# Patient Record
(demographics unavailable — no encounter records)

---

## 2024-12-23 NOTE — HISTORY OF PRESENT ILLNESS
[de-identified] : Plays with fidget spinner Frequent vocalizations to self  Clicks on I want to play, then pushes away toys I offer to her Comes behind desk and takes ball  Throws it to mom  Paces around room, lays on exam table Writes a little when asked to write name (writes lower case a, then gets up) Pushes pencil away when I ask her to write a 1  [FreeTextEntry5] : Placement: 7th Grade Type of Class: self-contained 8:1:2 Yariel Kaba MS Special Education: IEP Classification: Autism     Therapy: OT 2x42, PT 2x42, Speech/Language Therapy 4x42/10 day cycle (3 pull-out/1 push-in) Other Accommodations & Services:  - Aide or Paraprofessional 1:1  - Home VAN 2x60 (special ) - Home speech 1 hour a week - Parent training 30 hours yearly   Private: - OPWDD: Has medicaid waiver, Care Design (has not pursued self-direction yet) - Sensory Stars: ST weekly - Horsability on Saturdays - [place] respite on Saturdays (10-3)  - OMT treatments twice a month - S/P private ST at  Speech   4/15/24: IEP dated 12/8/23 reviewed. 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)  - This teacher is good but a little more set in her ways  - According to progress reports she is making progress, but she still has good days and bad days - Hard to tell why there is this variability day to day  - Overall behavior at school is about the same as last year  - Working on numerical concepts, will give 1 of 2 objects - Working on coins (matching them) but is not very interested  - Still go on field trips to sites in community (grocery store, post office) - Writes her first name with prompting, will not write numbers - Doing peer buddies and garden club after school - Added cooking activity at school (mom needs to send alternative ingredients in due to her dietary restrictions)   Home;  - Dad had surgery a few weeks ago and is now recovering so this has been a change for Itzel - Has been showing more verbal stimming, was so prominent she was not even eating breakfast  - Behavior at home is up and down - Throwing behavior still happens but less than before  - Need to watch what is left out, may get into things - Chews and bites on things often - Restarted [facility] over summer, goes most Saturdays, will go some days over break - Seems to do OK while there, keeps occupied  - Will also [facility] at the [facility]   Language;  - Changed Touch Chat device to 25 array (previously was 15) - Uses it at school and home to make requests for food and toys. - Not using verbal speech  Sleep:  - Has been having more trouble falling asleep - Clonidine was increased to 0.4 earlier this fall by Dr. Root - There was some improvement initially but now worse again - Most nights usually not asleep until after 11 PM, then sleeps through night until 7 AM   Toileting:  - BMs still fluctuate, usually formed but at times runny - Still on senna 5 squares, usually having BM daily in evening.   - Doing well with urine, may wear pull-up when they go out  - May jump up before she is done urinating, working on finishing and wiping - Wears pull-up overnight, usually wet - After OMT treatments she does better with stooling (otherwise she does more tensing and withholding).   ADLs: - Overall not much change in ADLs she can do on own - Can put on some garments, but mostly needs help with dressing  CAM;  - Seeing Dr. Root  - Glutathione infusions (about every other month)  Current meds/supplements: Clonidine 0.4 mg before bed (sleep) Hydroxyzine 100 mg before bed and after breakfast 100 mg BID (allergies) Senna 5 caps Calm Needs (combination of Sadie, Mg, other) - several caps a day CBD oil in evening (increased to 1 ml)  Sodium butyrate Itraconazole Fish oil Probiotic  B12  [FreeTextEntry6] : - Overall health has been good - CAM: Sees Dr. Root, see above. Previous treatments have included leucovorin, amoxicillin and valcyclovir, IV infusion treatments (chelation, glutathione and methylprednisolone). Continues to see cranio-scaral specialist for massages which seem to calm her. - Constipation/GI: Saw Dr. Esquivel in spring 2024.  Suggested increasing probiotics.  - Genetics: Prior CMA, HONEY were normal at [place] - Adolescent gyn; Seen by Dr. Baca for follow-up in July 2024. Recommended follow-up in 1 year, or sooner if gets period. Previously had discussed options for suppressing menstruation. She suggested having her see a nutritionist due to slow weight gain. - Eating: Eating a little more lately, has had good weight gain this fall. Gluten/soy/casein free diet since end of April 2019, she has been doing well with it. - Sleep: see above - Toileting: see above - Dental: Had 2 teeth pulled, repaired a chipped tooth in June 2024. Returned in November for follow-up, still with an impacted tooth. Will need another tooth removed in February.   - PMD: Tamika Bush. PE was 5/24/24

## 2024-12-23 NOTE — PLAN
[Findings (To Date)] : Findings from evaluation (to date) [Clinical Basis] : Clinical basis for current diagnosis and clinical impressions [Differential Diagnosis] : Differential diagnosis [Lab work-up] : laboratory work-up [Co-Morbidities] : Clinical disorders and problem commonly associated with this child's condition (now or in the future) [Medical Consultations] : Reviewed medical consultations [Developmental Testiing] : Clinical implications of developmental testing [Dev. Therapies: ____] : Benefits and limits of developmental therapies: [unfilled] [CAM Therapies] : Benefits and limits of CAM therapies [Resources] : Other available resources [CSE / IEP] : Committee on Special Education (CSE) evaluations and Individualized Education Programs (IEP) [Class Placement] : Appropriate class placement [Family Questions] : Family's questions were addressed [Diet] : Evidence-based clinical information about diet [Sleep] : The importance of sleep and strategies to ensure adequate sleep [FreeTextEntry3] : - Continue IEP services including home based services (BIS, ST) - See above for neuropsychological (PTONI 61), triennial results (SB FSIQ 40, VABS ABC 38)   - Continue private services (ST/OT, hippotherapy) - Working on obtaining self-direction through OPWDD - Previously discussed seeking private VAN to receive parent training.  - On clonidine 0.4 mg, hydroxyzine, variety of supplements - all prescribed by Dr. Root. On gluten/casein/soy free diet. Previously received IV treatments including chelation.  - Follow-up with GI/Nutrition - needs to schedule - Follow-up with adolescent GYN in July 2025 - Genetic testing including HONEY previously normal  - Follow-up with dentist - Previously introduced mother to [facility]  - Follow-up in 6-9 months

## 2024-12-23 NOTE — HISTORY OF PRESENT ILLNESS
[de-identified] : Plays with fidget spinner Frequent vocalizations to self  Clicks on I want to play, then pushes away toys I offer to her Comes behind desk and takes ball  Throws it to mom  Paces around room, lays on exam table Writes a little when asked to write name (writes lower case a, then gets up) Pushes pencil away when I ask her to write a 1  [FreeTextEntry5] : Placement: 7th Grade Type of Class: self-contained 8:1:2 Yariel Kaba MS Special Education: IEP Classification: Autism     Therapy: OT 2x42, PT 2x42, Speech/Language Therapy 4x42/10 day cycle (3 pull-out/1 push-in) Other Accommodations & Services:  - Aide or Paraprofessional 1:1  - Home VAN 2x60 (special ) - Home speech 1 hour a week - Parent training 30 hours yearly   Private: - OPWDD: Has medicaid waiver, Care Design (has not pursued self-direction yet) - Sensory Stars: ST weekly - Horsability on Saturdays - [place] respite on Saturdays (10-3)  - OMT treatments twice a month - S/P private ST at  Speech   4/15/24: IEP dated 12/8/23 reviewed. 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)  - This teacher is good but a little more set in her ways  - According to progress reports she is making progress, but she still has good days and bad days - Hard to tell why there is this variability day to day  - Overall behavior at school is about the same as last year  - Working on numerical concepts, will give 1 of 2 objects - Working on coins (matching them) but is not very interested  - Still go on field trips to sites in community (grocery store, post office) - Writes her first name with prompting, will not write numbers - Doing peer buddies and garden club after school - Added cooking activity at school (mom needs to send alternative ingredients in due to her dietary restrictions)   Home;  - Dad had surgery a few weeks ago and is now recovering so this has been a change for Itzel - Has been showing more verbal stimming, was so prominent she was not even eating breakfast  - Behavior at home is up and down - Throwing behavior still happens but less than before  - Need to watch what is left out, may get into things - Chews and bites on things often - Restarted [place] [facility] over summer, goes most Saturdays, will go some days over break - Seems to do OK while there, keeps occupied  - [facility]   Language;  - Changed Touch Chat device to 25 array (previously was 15) - Uses it at school and home to make requests for food and toys. - Not using verbal speech  Sleep:  - Has been having more trouble falling asleep - Clonidine was increased to 0.4 earlier this fall by Dr. Root - There was some improvement initially but now worse again - Most nights usually not asleep until after 11 PM, then sleeps through night until 7 AM   Toileting:  - BMs still fluctuate, usually formed but at times runny - Still on senna 5 squares, usually having BM daily in evening.   - Doing well with urine, may wear pull-up when they go out  - May jump up before she is done urinating, working on finishing and wiping - Wears pull-up overnight, usually wet - After OMT treatments she does better with stooling (otherwise she does more tensing and withholding).   ADLs: - Overall not much change in ADLs she can do on own - Can put on some garments, but mostly needs help with dressing  CAM;  - Seeing Dr. Root  - Glutathione infusions (about every other month)  Current meds/supplements: Clonidine 0.4 mg before bed (sleep) Hydroxyzine 100 mg before bed and after breakfast 100 mg BID (allergies) Senna 5 caps Calm Needs (combination of Sadie, Mg, other) - several caps a day CBD oil in evening (increased to 1 ml)  Sodium butyrate Itraconazole Fish oil Probiotic  B12  [FreeTextEntry6] : - Overall health has been good - CAM: Sees Dr. Root, see above. Previous treatments have included leucovorin, amoxicillin and valcyclovir, IV infusion treatments (chelation, glutathione and methylprednisolone). Continues to see cranio-scaral specialist for massages which seem to calm her. - Constipation/GI: Saw Dr. Esquivel in spring 2024.  Suggested increasing probiotics.  - Genetics: Prior CMA, HONEY were normal at [place] - Adolescent gyn; Seen by Dr. Baca for follow-up in July 2024. Recommended follow-up in 1 year, or sooner if gets period. Previously had discussed options for suppressing menstruation. She suggested having her see a nutritionist due to slow weight gain. - Eating: Eating a little more lately, has had good weight gain this fall. Gluten/soy/casein free diet since end of April 2019, she has been doing well with it. - Sleep: see above - Toileting: see above - Dental: Had 2 teeth pulled, repaired a chipped tooth in June 2024. Returned in November for follow-up, still with an impacted tooth. Will need another tooth removed in February.   - PMD: Tamika Bush. PE was 5/24/24

## 2024-12-23 NOTE — SOCIAL HISTORY
[FreeTextEntry6] : Patient lives with parent(s) and sisters (Sonia Campa).   Mother's Occupation: Teacher, currently homemaker   Father's Occupation: Teacher 5/3/22: No interval changes 9/28/22: Katheryn has been having a lot of anxiety since summer, intrusive thoughts, saw a psychiatrist yesterday and started on zoloft. MGM in hospital due to lesions found on bone scan, just had surgery. 1/17/23: Grandmother being treated for bone cancer, very stressful for mom. Became tearful during visit when we discussed OPWDD paperwork for Itzel. 10/17/23: Grandmother is very sick, receiving hospice care. 4/15/24: No interval changes at home. MGM passed on 11/11/23. Katheryn is on prozac 40 mg and atomoxetine.   12/17/24: Dad has spinal fusion surgery earlier this month, was hospitalized for a few days. Hoping to get back to work end of January. No other changes at home.

## 2024-12-23 NOTE — PHYSICAL EXAM
[Normal] : patient in no apparent distress, no dysmorphic features [de-identified] : thin female [de-identified] : Plays with fidget spinner Frequent vocalizations to self  Clicks "I want to play" on ipad, then pushes away toys I offer to her Comes behind desk and takes ball  Throws it to mom  Paces around room, lays on exam table Writes a little when asked to write name (writes lower case a, then gets up) Pushes pencil away when I ask her to write a 1

## 2024-12-23 NOTE — REASON FOR VISIT
[Follow-Up Visit] : a follow-up visit [FreeTextEntry2] : ASD, ID [FreeTextEntry1] : Mother [FreeTextEntry5] : Chart [FreeTextEntry3] : 4/15/24

## 2024-12-23 NOTE — PHYSICAL EXAM
[Normal] : patient in no apparent distress, no dysmorphic features [de-identified] : thin female [de-identified] : Plays with fidget spinner Frequent vocalizations to self  Clicks "I want to play" on ipad, then pushes away toys I offer to her Comes behind desk and takes ball  Throws it to mom  Paces around room, lays on exam table Writes a little when asked to write name (writes lower case a, then gets up) Pushes pencil away when I ask her to write a 1

## 2025-04-17 NOTE — DISCUSSION/SUMMARY
[Normal Growth] : growth [No Elimination Concerns] : elimination [Continue Regimen] : feeding [No Skin Concerns] : skin [Normal Sleep Pattern] : sleep [Anticipatory Guidance Given] : Anticipatory guidance addressed as per the history of present illness section [Physical Growth and Development] : physical growth and development [Social and Academic Competence] : social and academic competence [Emotional Well-Being] : emotional well-being [Risk Reduction] : risk reduction [Violence and Injury Prevention] : violence and injury prevention [No Medications] : ~He/She~ is not on any medications [Patient] : patient [Parent/Guardian] : Parent/Guardian [Full Activity without restrictions including Physical Education & Athletics] : Full Activity without restrictions including Physical Education & Athletics [I have examined the above-named student and completed the preparticipation physical evaluation. The athlete does not present apparent clinical contraindications to practice and participate in sport(s) as outlined above. A copy of the physical exam is on r] : I have examined the above-named student and completed the preparticipation physical evaluation. The athlete does not present apparent clinical contraindications to practice and participate in sport(s) as outlined above. A copy of the physical exam is on record in my office and can be made available to the school at the request of the parents. If conditions arise after the athlete has been cleared for participation, the physician may rescind the clearance until the problem is resolved and the potential consequences are completely explained to the athlete (and parents/guardians).

## 2025-04-17 NOTE — HISTORY OF PRESENT ILLNESS
[Mother] : mother [Yes] : Patient goes to dentist yearly [Toothpaste] : Primary Fluoride Source: Toothpaste [Up to date] : Up to date [Eats meals with family] : eats meals with family [Grade: ____] : Grade: [unfilled] [No] : No cigarette smoke exposure [Uses safety belts/safety equipment] : uses safety belts/safety equipment  [With Teen] : teen [With Parent/Guardian] : parent/guardian [NO] : No

## 2025-06-19 NOTE — REVIEW OF SYSTEMS
[Patient Intake Form Reviewed] : Patient intake form was reviewed [Negative] : Heme/Lymph [de-identified] : dev delayed - ASD

## 2025-06-19 NOTE — PHYSICAL EXAM
[General Appearance - In No Acute Distress] : in no acute distress [General Appearance - Well Nourished] : well nourished [Sclera] : the sclera were normal [Outer Ear] : the ears and nose were normal in appearance [Examination Of The Oral Cavity] : mucous membranes were moist and pink [Neck Cervical Mass (___cm)] : no neck mass was observed [Respiration, Rhythm And Depth] : normal respiratory rhythm and effort [Auscultation Breath Sounds / Voice Sounds] : clear bilateral breath sounds [Heart Rate And Rhythm] : heart rate and rhythm were normal [Heart Sounds] : normal S1 and S2 [Murmurs] : no murmurs [Bowel Sounds] : normal bowel sounds [Abdomen Soft] : soft [Abdomen Tenderness] : non-tender [Abdominal Distention] : nondistended [Musculoskeletal Exam: Normal Movement Of All Extremities] : normal movements of all extremities [Motor Tone] : muscle strength and tone were normal [Skin Color & Pigmentation] : normal skin color and pigmentation [] : no significant rash [Skin Lesions] : no skin lesions [FreeTextEntry1] : dev delayed -  ASD [External Female Genitalia] : normal external genitalia [Jerome Stage ___] : the Jerome stage for pubic hair development was [unfilled]

## 2025-06-19 NOTE — HISTORY OF PRESENT ILLNESS
[Preoperative Visit] : for a medical evaluation prior to surgery [Good] : Good [Anesthesia Reaction] : no anesthesia reaction [Clotting Disorder] : no clotting disorder [Bleeding Disorder] : no bleeding disorder [Sudden Death] : no sudden death [FreeTextEntry2] : September [de-identified] : Dr Elizalde [de-identified] : ASD [FreeTextEntry1] : PT HERE FOR MEDICAL CLEARANCE FOR DENTAL PROCEDURE - procedure is in September - they want a pre op with in 90 days   DOING WELL OVERALL   no allergies Meds- Clonidine , Hydroxyzine  Dentist - Dr. Elizalde  - [facility]

## 2025-07-06 NOTE — HISTORY OF PRESENT ILLNESS
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)  - Services will be the same next year - Will be in [employer], will be at another school this summer which will be a change prior: - Working on numerical concepts, will give 1 of 2 objects - Working on coins (matching them) but is not very interested  - Go on field trips to sites in community (grocery store, post office) - Writes her first name with prompting, will not write numbers - Doing peer buddies and garden club after school - Added cooking activity at school (mom needs to send alternative ingredients in due to her dietary restrictions)   Home;  - Mom seeing a lot of anxiety lately - pupils will dilate and she asks for food constantly (often seen when going to Dr. sosa, new places) - Randy had graduation party this weekend, mom had an aide to come help Itzel at the reception badillo, overall she did well with a lot of preparation  - Went to [place] yesterday, she was initially anxious but she did go on some raft rides  - In the evenings has been getting louder, screaming (but happy), usually after sunset  - Mom wonders about using medication to help her with the anxiety   Language;  - Uses Touch Chat device  - Uses it at school and home to make requests for food and toys. - Not using verbal speech  Sleep:  - Doesn't want to go to sleep at night - Engages in repetitive behaviors before bed, spits on back of hand and rubs face  - Most nights usually not asleep until after 11 PM, then sleeps through night until 7 AM   Toileting:  - BMs more formed now on new vitamin (Neuro Nourish)  - Senna 6 capsules daily, usually having BM daily in evening.  - May need to use suppositories as needed  - Usually doing well with urine, may wear pull-up when they go out  - Wears pull-up overnight, usually wet - After OMT treatments she does better with stooling (otherwise she does more tensing and withholding).   ADLs: - Overall not much change in ADLs she can do on own - Can put on some garments, but mostly needs help with dressing  CAM;  - Dr. Root moving to [place], planning to switch to a different provider - NP (Isabella Leary) who used to work with him - plans to take her off some of the meds she is on   - Glutathione infusions (about every other month)  Current meds/supplements (reviewed 7/1/25): Clonidine 0.4 mg before bed (sleep) Hydroxyzine 100 mg before bed and after breakfast 100 mg BID (allergies) Senna 6 caps MELISSA supplement Mg supplement before bed CBD oil in evening  Sodium butyrate Itraconazole - may be stopped Leucovorin 25 mg BID Fish oil Probiotic  B12  [FreeTextEntry5] : Placement: 7th Grade - just completed  Type of Class: self-contained 8:1:2 Yariel Kaba MS Special Education: IEP Classification: Autism     Therapy: OT 2x42, PT 2x42, Speech/Language Therapy 2x42/10 day cycle Other Accommodations & Services:  - Aide or Paraprofessional 1:1  - Home VAN 2x60 (special ) - 1x/week over summer - Home speech 1 hour a week - Parent training 30 hours yearly - different person than the BIS provider   Private: - OPWDD: Has medicaid waiver, Care Design (has not pursued self-direction yet) - Sensory Stars: ST weekly - Horsability on Saturdays - [place] respite on Saturdays (10-3)  - OMT treatments twice a month - S/P private ST at  Speech   7/1/25: IEP dated 1/22/25 reviewed. [FreeTextEntry6] : - Had flu in March  - CAM: Sees Dr. Root, see above. Previous treatments have included leucovorin, amoxicillin and valcyclovir, IV infusion treatments (chelation, glutathione and methylprednisolone). Continues to see cranio-scaral specialist for massages which seem to calm her. - Constipation/GI: Saw Dr. Esquivel 6/6/25. Impressed by her weight gain. Discussed avoiding foods that may cause gas pains and limiting suppository use.  - Genetics: Prior CMA, HONEY were normal at [place] - Adolescent gyn; Seen by Dr. Baca for follow-up in July 2024, will be seen again end of July. Previously had discussed options for suppressing menstruation. She suggested having her see a nutritionist due to slow weight gain. - Vision; Saw srinath roberts (Dr. Jolanta Venegas with [facility] in [place]), had dilated exam and looked OK - Eating: Eating more lately, mom think supplements are helping with better absorption. Gluten/soy/casein free diet since end of April 2019, she has been doing well with it. - Sleep: see above - Toileting: see above - Dental: Plan to do exam/x-rays under anesthesia in September. Had 2 teeth pulled, repaired a chipped tooth in June 2024 - PMD: Tamika Bush. PE was 4/17/25

## 2025-07-06 NOTE — PHYSICAL EXAM
[Normal] : awake and interactive [de-identified] : Makes vocalizations Holds slinky eats snacks very quickly  clicks drink water on ipad when we ask if she wants water In and out of seat  Interested in light up ball, stands and walks around with it  Overall calm throughout visit

## 2025-07-06 NOTE — SOCIAL HISTORY
[FreeTextEntry6] : Patient lives with parents and sisters (Sonia Campa).   Mother's Occupation: Teacher, currently homemaker   Father's Occupation: Teacher 5/3/22: No interval changes 9/28/22: Katheryn has been having a lot of anxiety since summer, intrusive thoughts, saw a psychiatrist yesterday and started on zoloft. MGM in hospital due to lesions found on bone scan, just had surgery. 1/17/23: Grandmother being treated for bone cancer, very stressful for mom. Became tearful during visit when we discussed OPWDD paperwork for Itzel. 10/17/23: Grandmother is very sick, receiving hospice care. 4/15/24: No interval changes at home. MGM passed on 11/11/23. Katheryn is on prozac 40 mg and atomoxetine.   12/17/24: Dad has spinal fusion surgery earlier this month, was hospitalized for a few days. Hoping to get back to work end of January. No other changes at home.  7/1/25: Katheryn graduated and will be going to [employer] in fall. Sonia is doing better, seeing psycho NP, on fluoxetine 20 mg.

## 2025-07-06 NOTE — REASON FOR VISIT
[Follow-Up Visit] : a follow-up visit [FreeTextEntry2] : ASD, ID [FreeTextEntry1] : Mother [FreeTextEntry5] : Chart, IEP, progress report [FreeTextEntry3] : 12/17/24

## 2025-07-06 NOTE — HISTORY OF PRESENT ILLNESS
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)  - Services will be the same next year - Will be in [employer], will be at another school this summer which will be a change prior: - Working on numerical concepts, will give 1 of 2 objects - Working on coins (matching them) but is not very interested  - Go on field trips to sites in community (grocery store, post office) - Writes her first name with prompting, will not write numbers - Doing peer buddies and garden club after school - Added cooking activity at school (mom needs to send alternative ingredients in due to her dietary restrictions)   Home;  - Mom seeing a lot of anxiety lately - pupils will dilate and she asks for food constantly (often seen when going to Dr. sosa, new places) - Randy had graduation party this weekend, mom had an aide to come help Itzel at the reception badillo, overall she did well with a lot of preparation  - Went to [place] yesterday, she was initially anxious but she did go on some raft rides  - In the evenings has been getting louder, screaming (but happy), usually after sunset  - Mom wonders about using medication to help her with the anxiety   Language;  - Uses Touch Chat device  - Uses it at school and home to make requests for food and toys. - Not using verbal speech  Sleep:  - Doesn't want to go to sleep at night - Engages in repetitive behaviors before bed, spits on back of hand and rubs face  - Most nights usually not asleep until after 11 PM, then sleeps through night until 7 AM   Toileting:  - BMs more formed now on new vitamin (Neuro Nourish)  - Senna 6 capsules daily, usually having BM daily in evening.  - May need to use suppositories as needed  - Usually doing well with urine, may wear pull-up when they go out  - Wears pull-up overnight, usually wet - After OMT treatments she does better with stooling (otherwise she does more tensing and withholding).   ADLs: - Overall not much change in ADLs she can do on own - Can put on some garments, but mostly needs help with dressing  CAM;  - Dr. Root moving to [place], planning to switch to a different provider - NP (Isabella Leary) who used to work with him - plans to take her off some of the meds she is on   - Glutathione infusions (about every other month)  Current meds/supplements (reviewed 7/1/25): Clonidine 0.4 mg before bed (sleep) Hydroxyzine 100 mg before bed and after breakfast 100 mg BID (allergies) Senna 6 caps MELISSA supplement Mg supplement before bed CBD oil in evening  Sodium butyrate Itraconazole - may be stopped Leucovorin 25 mg BID Fish oil Probiotic  B12  [FreeTextEntry5] : Placement: 7th Grade - just completed  Type of Class: self-contained 8:1:2 Yariel Kaba MS Special Education: IEP Classification: Autism     Therapy: OT 2x42, PT 2x42, Speech/Language Therapy 2x42/10 day cycle Other Accommodations & Services:  - Aide or Paraprofessional 1:1  - Home VAN 2x60 (special ) - 1x/week over summer - Home speech 1 hour a week - Parent training 30 hours yearly - different person than the BIS provider   Private: - OPWDD: Has medicaid waiver, Care Design (has not pursued self-direction yet) - Sensory Stars: ST weekly - [employer] on Saturdays - [place] respite on Saturdays (10-3)  - OMT treatments twice a month - S/P private ST at  Speech   7/1/25: IEP dated 1/22/25 reviewed. [FreeTextEntry6] : - Had flu in March  - CAM: Sees Dr. Root, see above. Previous treatments have included leucovorin, amoxicillin and valcyclovir, IV infusion treatments (chelation, glutathione and methylprednisolone). Continues to see cranio-scaral specialist for massages which seem to calm her. - Constipation/GI: Saw Dr. Esquivel 6/6/25. Impressed by her weight gain. Discussed avoiding foods that may cause gas pains and limiting suppository use.  - Genetics: Prior CMA, HONEY were normal at [facility] - Adolescent gyn; Seen by Dr. Baca for follow-up in July 2024, will be seen again end of July. Previously had discussed options for suppressing menstruation. She suggested having her see a nutritionist due to slow weight gain. - Vision; Saw srinath roberts (Dr. Jolanta Venegas with [facility] in [place]), had dilated exam and looked OK - Eating: Eating more lately, mom think supplements are helping with better absorption. Gluten/soy/casein free diet since end of April 2019, she has been doing well with it. - Sleep: see above - Toileting: see above - Dental: Plan to do exam/x-rays under anesthesia in September. Had 2 teeth pulled, repaired a chipped tooth in June 2024 - PMD: Tamika Bush. PE was 4/17/25

## 2025-07-06 NOTE — PLAN
[Findings (To Date)] : Findings from evaluation (to date) [Clinical Basis] : Clinical basis for current diagnosis and clinical impressions [Differential Diagnosis] : Differential diagnosis [Lab work-up] : laboratory work-up [Co-Morbidities] : Clinical disorders and problem commonly associated with this child's condition (now or in the future) [Medical Consultations] : Reviewed medical consultations [Dev. Therapies: ____] : Benefits and limits of developmental therapies: [unfilled] [CAM Therapies] : Benefits and limits of CAM therapies [Resources] : Other available resources [CSE / IEP] : Committee on Special Education (CSE) evaluations and Individualized Education Programs (IEP) [Class Placement] : Appropriate class placement [Family Questions] : Family's questions were addressed [Diet] : Evidence-based clinical information about diet [Sleep] : The importance of sleep and strategies to ensure adequate sleep [FreeTextEntry3] : - Continue IEP services including home based services (BIS, ST) - See above for neuropsychological (PTONI 61), triennial results (SB FSIQ 40, VABS ABC 38)   - Continue private services (ST/OT, hippotherapy) - Working on obtaining self-direction through OPWDD - Previously discussed seeking private VAN to receive parent training.  - On clonidine 0.4 mg, hydroxyzine, variety of supplements - all prescribed by Dr. Root. On gluten/casein/soy free diet. Previously received IV treatments including chelation.  - Discussed possible trial of anxiety treatment in future - Follow-up with GI/Nutrition  - Follow-up with adolescent GYN in July 2025 - Genetic testing including HONEY previously normal  - Follow-up with dentist - Previously introduced mother to [facility]  - Follow-up in 6-9 months  [Goals / Benefits] : Goals & potential benefits of treatment with medication, as well as the limitations of pharmacotherapy

## 2025-07-06 NOTE — SOCIAL HISTORY
[FreeTextEntry6] : Patient lives with parents and sisters (Sonia Campa).   Mother's Occupation: Teacher, currently homemaker   Father's Occupation: Teacher 5/3/22: No interval changes 9/28/22: Katheryn has been having a lot of anxiety since summer, intrusive thoughts, saw a psychiatrist yesterday and started on zoloft. MGM in hospital due to lesions found on bone scan, just had surgery. 1/17/23: Grandmother being treated for bone cancer, very stressful for mom. Became tearful during visit when we discussed OPWDD paperwork for Itzel. 10/17/23: Grandmother is very sick, receiving hospice care. 4/15/24: No interval changes at home. MGM passed on 11/11/23. Katheryn is on prozac 40 mg and atomoxetine.   12/17/24: Dad has spinal fusion surgery earlier this month, was hospitalized for a few days. Hoping to get back to work end of January. No other changes at home.  7/1/25: Katheryn graduated and will be going to [place] in fall. Sonia is doing better, seeing psycho NP, on fluoxetine 20 mg.

## 2025-07-06 NOTE — PHYSICAL EXAM
[Normal] : awake and interactive [de-identified] : Makes vocalizations Holds slinky eats snacks very quickly  clicks drink water on ipad when we ask if she wants water In and out of seat  Interested in light up ball, stands and walks around with it  Overall calm throughout visit